# Patient Record
Sex: FEMALE | Race: WHITE | NOT HISPANIC OR LATINO
[De-identification: names, ages, dates, MRNs, and addresses within clinical notes are randomized per-mention and may not be internally consistent; named-entity substitution may affect disease eponyms.]

---

## 2022-08-01 ENCOUNTER — APPOINTMENT (OUTPATIENT)
Dept: POPULATION HEALTH | Facility: CLINIC | Age: 42
End: 2022-08-01

## 2022-08-01 ENCOUNTER — TRANSCRIPTION ENCOUNTER (OUTPATIENT)
Age: 42
End: 2022-08-01

## 2022-08-01 DIAGNOSIS — Z82.0 FAMILY HISTORY OF EPILEPSY AND OTHER DISEASES OF THE NERVOUS SYSTEM: ICD-10-CM

## 2022-08-01 DIAGNOSIS — Z81.8 FAMILY HISTORY OF OTHER MENTAL AND BEHAVIORAL DISORDERS: ICD-10-CM

## 2022-08-01 DIAGNOSIS — Z91.040 LATEX ALLERGY STATUS: ICD-10-CM

## 2022-08-01 DIAGNOSIS — J30.81 ALLERGIC RHINITIS DUE TO ANIMAL (CAT) (DOG) HAIR AND DANDER: ICD-10-CM

## 2022-08-01 DIAGNOSIS — Z80.7 FAMILY HISTORY OF OTHER MALIGNANT NEOPLASMS OF LYMPHOID, HEMATOPOIETIC AND RELATED TISSUES: ICD-10-CM

## 2022-08-01 DIAGNOSIS — Z78.9 OTHER SPECIFIED HEALTH STATUS: ICD-10-CM

## 2022-08-01 DIAGNOSIS — L23.9 ALLERGIC CONTACT DERMATITIS, UNSPECIFIED CAUSE: ICD-10-CM

## 2022-08-01 DIAGNOSIS — Z83.6 FAMILY HISTORY OF OTHER DISEASES OF THE RESPIRATORY SYSTEM: ICD-10-CM

## 2022-08-01 PROBLEM — Z00.00 ENCOUNTER FOR PREVENTIVE HEALTH EXAMINATION: Status: ACTIVE | Noted: 2022-08-01

## 2022-08-01 PROCEDURE — 99205 OFFICE O/P NEW HI 60 MIN: CPT

## 2022-08-01 PROCEDURE — 99072 ADDL SUPL MATRL&STAF TM PHE: CPT

## 2022-08-01 NOTE — HISTORY OF PRESENT ILLNESS
[FreeTextEntry1] : 41 yo female w pmh of atopy here for evaluation regarding sensitivity to environmental exposure. She was in [facility] in a cabin last month where she developed primarily neurologic symptoms when in the cabin which persisted while there. Began noticing tingling in face and next day developed swelling of eyes.\par She did note musty, zaheer smell upon entering but o/w no foul odors. Noticed dead insect around the cabin over several days.  With prolonged time in the cabin began developing impaired concentration. She thought it was animal dander that she was reacting to and aggressively cleaned the cabin. Carpets were steam cleaned but afterwards symptoms were worse. After several nights of these, shifted to a hotel where she was sx free. Upon return, sxs developed right a way--numbness in lips, cheek, shoulders, affecting her speech, difficulty breathing. Shifted back to a hotel and then left the cabin for good. Subsequently, even after  leaving, felt weakness, fatigue. Upon returning to her apartment in [facility], he sxs returned with contact of clothing and objects that had been at the cabin. Then was having similar sxs when in contact with surfaces that had been touched by clothes/objects from the cabin. For example,  couch where she had been sitting when wearing clothes from the cabin would elicit symptoms. These clothes and items would elicit symptoms even after multiple washings. 10 days ago such contact resulted in severe sxs for which she went to ED where she was given antihistamine, inhaler, oral steroids. Has not used inhaler. Not clear if prednisone helped. Subsequently, was having reactions in her apartment. \par Last week,, stayed with a friend sxs improved but still reacting to items she had brought with her. Upon return to apt, sxs returned. She has left and avoided all contact with her apt and any items in there. \par \par Landlord of cabin denies application of pesticide. But did indicate there had been pets in the cabin the year prior. \par \par Had a telehealth with allergist, who didn't think he sxs were from allergic reaction. Stopped taking Zyrtec which wasn't helping. Currently on no medication.\par \par Anxiety/stress described as associated with dealing with these sxs. She asked if exposure causing this or anxiety.\par \par No hx of such phenomenon though did have a upper and lower respiratory symptoms following exposure to  used on a couch 2 years prior.\par \par  \par \par \par \par \par PMH

## 2022-08-01 NOTE — PHYSICAL EXAM
[General Appearance - Alert] : alert [General Appearance - Well Nourished] : well nourished [General Appearance - Well-Appearing] : healthy appearing [Oriented To Time, Place, And Person] : oriented to person, place, and time [Impaired Insight] : insight and judgment were intact [Affect] : the affect was normal [FreeTextEntry1] : deferred

## 2022-08-01 NOTE — ASSESSMENT
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 The ddx would include allergic sensitization, anxiety, or perhaps some underlying neurological pathology.\par Given the lack of improvement on corticosteroids, an allergic process seems less likely despite the notable mold in the cabin. The triggering of sxs in connection with items previously washed (sometimes repeatedly) makes even chemical trigger harder to explain.\par Pt is to have a full physical with a PMD this week and I recommended an eval with neurology (which she has scheduled for next month) to evaluate the tingling sensation. I further recommend treating the anxiety which I indicated to her could be driving her symptoms or a consequence of the situation (or both) but getting it well controlled would confer benefit, regardless.\par I advised avoidance of items from the cabin and continued cleaning. I further suggested avoidance of respiratory irritants and harsh chemicals, generally. I suspect with the passage of time, her sxs should improve and ultimately resolve. \par It is not clear to me what the underlying cause of her symptoms is. The temporal history points to some trigger in the cabin but exactly what is not clear nor is the reason for the repeated triggers from items that have been washed. \par No need for further office f/u unless she feels it warranted.\par